# Patient Record
Sex: FEMALE | Race: WHITE | NOT HISPANIC OR LATINO | Employment: UNEMPLOYED | URBAN - METROPOLITAN AREA
[De-identification: names, ages, dates, MRNs, and addresses within clinical notes are randomized per-mention and may not be internally consistent; named-entity substitution may affect disease eponyms.]

---

## 2020-01-24 ENCOUNTER — APPOINTMENT (OUTPATIENT)
Dept: GENERAL RADIOLOGY | Facility: HOSPITAL | Age: 80
End: 2020-01-24

## 2020-01-24 ENCOUNTER — APPOINTMENT (OUTPATIENT)
Dept: CT IMAGING | Facility: HOSPITAL | Age: 80
End: 2020-01-24

## 2020-01-24 ENCOUNTER — HOSPITAL ENCOUNTER (EMERGENCY)
Facility: HOSPITAL | Age: 80
Discharge: HOME | End: 2020-01-24
Attending: EMERGENCY MEDICINE | Admitting: EMERGENCY MEDICINE

## 2020-01-24 VITALS
BODY MASS INDEX: 24.15 KG/M2 | TEMPERATURE: 97.5 F | SYSTOLIC BLOOD PRESSURE: 184 MMHG | OXYGEN SATURATION: 98 % | RESPIRATION RATE: 20 BRPM | HEART RATE: 117 BPM | HEIGHT: 60 IN | WEIGHT: 123 LBS | DIASTOLIC BLOOD PRESSURE: 97 MMHG

## 2020-01-24 DIAGNOSIS — S02.2XXA CLOSED FRACTURE OF NASAL BONE, INITIAL ENCOUNTER: Primary | ICD-10-CM
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
## 2020-01-24 DIAGNOSIS — M25.462 EFFUSION OF KNEE JOINT, LEFT: ICD-10-CM

## 2020-01-24 PROCEDURE — 73560 X-RAY EXAM OF KNEE 1 OR 2: CPT

## 2020-01-24 PROCEDURE — 70486 CT MAXILLOFACIAL W/O DYE: CPT

## 2020-01-24 PROCEDURE — 99283 EMERGENCY DEPT VISIT LOW MDM: CPT

## 2020-01-24 RX ORDER — NAPROXEN 500 MG/1
500 TABLET ORAL 2 TIMES DAILY PRN
Qty: 20 TABLET | Refills: 0 | Status: SHIPPED | OUTPATIENT
Start: 2020-01-24 | End: 2020-02-03

## 2020-01-25 NOTE — DISCHARGE INSTRUCTIONS
Symptomatic care is recommended.  Take all medications as prescribed and instructed.  Follow-up with primary care physician and ENT as recommended.  Return to ED with worsening of symptoms.

## 2020-01-25 NOTE — ED PROVIDER NOTES
Subjective   Sangeeta Nguyen is a 79 y.o female who presents to the ED with complaints of a fall. The patient reports she suffered a fall tonight after she accidentally missed her step while stepping off of a curb. She fell forward onto her knees and hit her face onto the concrete. She did not lose consciousness. The patient suffered an abrasion to her nose that was accompanied by epistaxis, as well as a hematoma to her forehead. She also complains of left knee pain. She denies headache, dizziness, chest pain, and shortness of breath. She is not currently on blood thinners. There are no other acute symptoms at this time.      History provided by:  Patient  Fall   Mechanism of injury: fall    Injury location:  Leg and face  Facial injury location:  Forehead and nose  Leg injury location:  L knee  Arrived directly from scene: yes    Fall:     Fall occurred:  Walking and tripped    Impact surface:  [place]    Point of impact:  Head and knees    Entrapped after fall: no    Protective equipment: none    Tetanus status:  Unknown  Prior to arrival data:     Patient ambulatory at scene: yes      Blood loss:  Minimal    Responsiveness at scene:  Alert    Loss of consciousness: no      Amnesic to event: no    Associated symptoms: no chest pain, no headaches and no loss of consciousness        Review of Systems   HENT: Positive for nosebleeds.    Respiratory: Negative for shortness of breath.    Cardiovascular: Negative for chest pain.   Musculoskeletal: Negative for gait problem.        Left knee pain   Skin: Positive for wound.   Neurological: Negative for dizziness, loss of consciousness and headaches.   All other systems reviewed and are negative.      History reviewed. No pertinent past medical history.    No Known Allergies    History reviewed. No pertinent surgical history.    No family history on file.    Social History     Socioeconomic History   • Marital status:      Spouse name: Not on file   • Number of  children: Not on file   • Years of education: Not on file   • Highest education level: Not on file         Objective   Physical Exam   Constitutional: She is oriented to person, place, and time. She appears well-developed and well-nourished. No distress.   HENT:   Head: Normocephalic.   Nose: Nose normal.   Small superficial abrasion to the bridge of nose. Hematoma on forehead. No evidence of septal hematoma.   Eyes: Conjunctivae are normal. No scleral icterus.   Neck: Normal range of motion. Neck supple.   Cardiovascular: Regular rhythm, normal heart sounds and intact distal pulses. Tachycardia present.   No murmur heard.  Pulmonary/Chest: Effort normal and breath sounds normal. No respiratory distress.   Abdominal: Soft. There is no tenderness.   Musculoskeletal: Normal range of motion. She exhibits edema.   Mild erythema and edema to left knee.   Neurological: She is alert and oriented to person, place, and time.   Skin: Skin is warm and dry.   Normal peripheral perfusion.   Psychiatric: She has a normal mood and affect. Her behavior is normal.   Nursing note and vitals reviewed.      Procedures         ED Course  ED Course as of Jan 24 2049 Fri Jan 24, 2020 2020 Anish Caal is at bedside re-examining the patient, discussing all results, and updating them on the plan. The patient is resting comfortably in no acute distress.    [PK]   2047 Patient presents to ED with complaints of pain in her face and nose in addition to left knee pain following a fall.  Effusion of left knee without acute fractures or dislocations.  Knee placed in Ace wrap and patient given instructions on symptomatic care.  Patient with bilateral nasal bone fractures, no evidence of septal hematoma and in no acute respiratory distress.  Patient given instructions for follow-up with ENT.  Patient is afebrile, nontoxic appearing, vital signs stable and able to maintain O2 sats of 99% on room air. Patient will be discharged home with  "outpatient follow up to their primary care provider and ENT as recommended. Patient and family are agreeable to plan of care of outpatient follow up, provided clear return precautions and demonstrated understanding.    [JG]      ED Course User Index  [JG] Anish Parks PA  [PK] Raffi Andrews     No results found for this or any previous visit (from the past 24 hour(s)).  Note: In addition to lab results from this visit, the labs listed above may include labs taken at another facility or during a different encounter within the last 24 hours. Please correlate lab times with ED admission and discharge times for further clarification of the services performed during this visit.    XR Knee 1 or 2 View Left   Final Result   1. No visible acute osseous abnormality.   2. Joint effusion.      Signer Name: Orlando Townsend MD    Signed: 1/24/2020 8:13 PM    Workstation Name: [facility]     Radiology [facility]      CT Maxillofacial Without Contrast   Final Result   1.  Bilateral nasal bone fractures, slightly depressed on the left.   2.  Nondisplaced fracture through the midportion of the bony nasal septum.   3.  No additional facial fracture.   4.  Anterior frontal scalp contusion extending over the nasal bridge.      Signer Name: Orlando Townsend MD    Signed: 1/24/2020 8:03 PM    Workstation Name: [facility]     [facility]        Vitals:    01/24/20 1902 01/24/20 2000   BP: (!) 218/98 (!) 184/97   BP Location: Left arm    Patient Position: Sitting    Pulse: 117    Resp: 20    Temp: 97.5 °F (36.4 °C)    TempSrc: Oral    SpO2: 99% 98%   Weight: 55.8 kg (123 lb)    Height: 152.4 cm (60\")      Medications - No data to display  ECG/EMG Results (last 24 hours)     ** No results found for the last 24 hours. **        No orders to display                   MDM  Number of Diagnoses or Management Options  Closed fracture of nasal bone, initial encounter: new and requires " workup  Effusion of knee joint, left: new and requires workup  Fall (on)(from) sidewalk curb, initial encounter: new and requires workup     Amount and/or Complexity of Data Reviewed  Tests in the radiology section of CPT®: reviewed    Risk of Complications, Morbidity, and/or Mortality  Presenting problems: moderate  Diagnostic procedures: moderate  Management options: moderate    Patient Progress  Patient progress: stable      Final diagnoses:   Closed fracture of nasal bone, initial encounter   Effusion of knee joint, left   Fall (on)(from) sidewalk curb, initial encounter       Documentation assistance provided by santosh Andrews.  Information recorded by the scribe was done at my direction and has been verified and validated by me.     Raffi Andrews  01/24/20 1935       Raffi Andrews  01/24/20 2033       Anish Parks PA  01/24/20 2050